# Patient Record
Sex: FEMALE | Race: OTHER | HISPANIC OR LATINO | Employment: FULL TIME | URBAN - METROPOLITAN AREA
[De-identification: names, ages, dates, MRNs, and addresses within clinical notes are randomized per-mention and may not be internally consistent; named-entity substitution may affect disease eponyms.]

---

## 2018-10-03 ENCOUNTER — HOSPITAL ENCOUNTER (EMERGENCY)
Facility: HOSPITAL | Age: 43
Discharge: HOME | End: 2018-10-03
Attending: EMERGENCY MEDICINE
Payer: COMMERCIAL

## 2018-10-03 ENCOUNTER — APPOINTMENT (EMERGENCY)
Dept: RADIOLOGY | Facility: HOSPITAL | Age: 43
End: 2018-10-03
Payer: COMMERCIAL

## 2018-10-03 VITALS
RESPIRATION RATE: 18 BRPM | OXYGEN SATURATION: 98 % | WEIGHT: 177 LBS | DIASTOLIC BLOOD PRESSURE: 63 MMHG | TEMPERATURE: 98.5 F | HEART RATE: 82 BPM | SYSTOLIC BLOOD PRESSURE: 102 MMHG

## 2018-10-03 DIAGNOSIS — R50.9 FEVER: ICD-10-CM

## 2018-10-03 DIAGNOSIS — R19.7 DIARRHEA: ICD-10-CM

## 2018-10-03 DIAGNOSIS — K52.9 COLITIS: Primary | ICD-10-CM

## 2018-10-03 DIAGNOSIS — R10.9 ABDOMINAL PAIN: ICD-10-CM

## 2018-10-03 LAB
ALBUMIN SERPL BCP-MCNC: 3.7 G/DL (ref 3.5–5)
ALP SERPL-CCNC: 58 U/L (ref 46–116)
ALT SERPL W P-5'-P-CCNC: 24 U/L (ref 12–78)
ANION GAP SERPL CALCULATED.3IONS-SCNC: 6 MMOL/L (ref 4–13)
AST SERPL W P-5'-P-CCNC: 20 U/L (ref 5–45)
ATRIAL RATE: 94 BPM
BASOPHILS # BLD AUTO: 0.01 THOUSANDS/ΜL (ref 0–0.1)
BASOPHILS NFR BLD AUTO: 0 % (ref 0–1)
BILIRUB SERPL-MCNC: 1.33 MG/DL (ref 0.2–1)
BUN SERPL-MCNC: 12 MG/DL (ref 5–25)
CALCIUM SERPL-MCNC: 9.1 MG/DL (ref 8.3–10.1)
CHLORIDE SERPL-SCNC: 99 MMOL/L (ref 100–108)
CO2 SERPL-SCNC: 27 MMOL/L (ref 21–32)
CREAT SERPL-MCNC: 0.84 MG/DL (ref 0.6–1.3)
EOSINOPHIL # BLD AUTO: 0 THOUSAND/ΜL (ref 0–0.61)
EOSINOPHIL NFR BLD AUTO: 0 % (ref 0–6)
ERYTHROCYTE [DISTWIDTH] IN BLOOD BY AUTOMATED COUNT: 12.2 % (ref 11.6–15.1)
GFR SERPL CREATININE-BSD FRML MDRD: 85 ML/MIN/1.73SQ M
GLUCOSE SERPL-MCNC: 123 MG/DL (ref 65–140)
HCT VFR BLD AUTO: 43.4 % (ref 34.8–46.1)
HGB BLD-MCNC: 14.5 G/DL (ref 11.5–15.4)
HOLD SPECIMEN: NORMAL
IMM GRANULOCYTES # BLD AUTO: 0.02 THOUSAND/UL (ref 0–0.2)
IMM GRANULOCYTES NFR BLD AUTO: 0 % (ref 0–2)
LIPASE SERPL-CCNC: 68 U/L (ref 73–393)
LYMPHOCYTES # BLD AUTO: 0.41 THOUSANDS/ΜL (ref 0.6–4.47)
LYMPHOCYTES NFR BLD AUTO: 6 % (ref 14–44)
MCH RBC QN AUTO: 30.4 PG (ref 26.8–34.3)
MCHC RBC AUTO-ENTMCNC: 33.4 G/DL (ref 31.4–37.4)
MCV RBC AUTO: 91 FL (ref 82–98)
MONOCYTES # BLD AUTO: 0.39 THOUSAND/ΜL (ref 0.17–1.22)
MONOCYTES NFR BLD AUTO: 6 % (ref 4–12)
NEUTROPHILS # BLD AUTO: 5.66 THOUSANDS/ΜL (ref 1.85–7.62)
NEUTS SEG NFR BLD AUTO: 88 % (ref 43–75)
NRBC BLD AUTO-RTO: 0 /100 WBCS
P AXIS: 62 DEGREES
PLATELET # BLD AUTO: 217 THOUSANDS/UL (ref 149–390)
PMV BLD AUTO: 9.6 FL (ref 8.9–12.7)
POTASSIUM SERPL-SCNC: 3.3 MMOL/L (ref 3.5–5.3)
PR INTERVAL: 150 MS
PROT SERPL-MCNC: 8.2 G/DL (ref 6.4–8.2)
QRS AXIS: 62 DEGREES
QRSD INTERVAL: 80 MS
QT INTERVAL: 334 MS
QTC INTERVAL: 417 MS
RBC # BLD AUTO: 4.77 MILLION/UL (ref 3.81–5.12)
SODIUM SERPL-SCNC: 132 MMOL/L (ref 136–145)
T WAVE AXIS: 47 DEGREES
VENTRICULAR RATE: 94 BPM
WBC # BLD AUTO: 6.49 THOUSAND/UL (ref 4.31–10.16)

## 2018-10-03 PROCEDURE — 74177 CT ABD & PELVIS W/CONTRAST: CPT

## 2018-10-03 PROCEDURE — 93010 ELECTROCARDIOGRAM REPORT: CPT | Performed by: INTERNAL MEDICINE

## 2018-10-03 PROCEDURE — 96361 HYDRATE IV INFUSION ADD-ON: CPT

## 2018-10-03 PROCEDURE — 96374 THER/PROPH/DIAG INJ IV PUSH: CPT

## 2018-10-03 PROCEDURE — 83690 ASSAY OF LIPASE: CPT | Performed by: EMERGENCY MEDICINE

## 2018-10-03 PROCEDURE — 93005 ELECTROCARDIOGRAM TRACING: CPT

## 2018-10-03 PROCEDURE — 80053 COMPREHEN METABOLIC PANEL: CPT | Performed by: EMERGENCY MEDICINE

## 2018-10-03 PROCEDURE — 85025 COMPLETE CBC W/AUTO DIFF WBC: CPT | Performed by: EMERGENCY MEDICINE

## 2018-10-03 PROCEDURE — 99284 EMERGENCY DEPT VISIT MOD MDM: CPT

## 2018-10-03 PROCEDURE — 36415 COLL VENOUS BLD VENIPUNCTURE: CPT

## 2018-10-03 RX ORDER — SUCRALFATE ORAL 1 G/10ML
1000 SUSPENSION ORAL ONCE
Status: COMPLETED | OUTPATIENT
Start: 2018-10-03 | End: 2018-10-03

## 2018-10-03 RX ORDER — ACETAMINOPHEN 325 MG/1
650 TABLET ORAL ONCE
Status: COMPLETED | OUTPATIENT
Start: 2018-10-03 | End: 2018-10-03

## 2018-10-03 RX ORDER — MAGNESIUM HYDROXIDE/ALUMINUM HYDROXICE/SIMETHICONE 120; 1200; 1200 MG/30ML; MG/30ML; MG/30ML
30 SUSPENSION ORAL ONCE
Status: COMPLETED | OUTPATIENT
Start: 2018-10-03 | End: 2018-10-03

## 2018-10-03 RX ORDER — IBUPROFEN 200 MG
200 TABLET ORAL EVERY 6 HOURS
COMMUNITY

## 2018-10-03 RX ORDER — ONDANSETRON 2 MG/ML
4 INJECTION INTRAMUSCULAR; INTRAVENOUS ONCE
Status: COMPLETED | OUTPATIENT
Start: 2018-10-03 | End: 2018-10-03

## 2018-10-03 RX ORDER — POTASSIUM CHLORIDE 20 MEQ/1
40 TABLET, EXTENDED RELEASE ORAL ONCE
Status: COMPLETED | OUTPATIENT
Start: 2018-10-03 | End: 2018-10-03

## 2018-10-03 RX ADMIN — SODIUM CHLORIDE 1000 ML: 0.9 INJECTION, SOLUTION INTRAVENOUS at 16:24

## 2018-10-03 RX ADMIN — IOHEXOL 100 ML: 350 INJECTION, SOLUTION INTRAVENOUS at 17:06

## 2018-10-03 RX ADMIN — SUCRALFATE 1000 MG: 1 SUSPENSION ORAL at 18:23

## 2018-10-03 RX ADMIN — LIDOCAINE HYDROCHLORIDE 15 ML: 20 SOLUTION ORAL; TOPICAL at 18:23

## 2018-10-03 RX ADMIN — ACETAMINOPHEN 650 MG: 325 TABLET ORAL at 14:56

## 2018-10-03 RX ADMIN — POTASSIUM CHLORIDE 40 MEQ: 1500 TABLET, EXTENDED RELEASE ORAL at 18:23

## 2018-10-03 RX ADMIN — ONDANSETRON 4 MG: 2 INJECTION INTRAMUSCULAR; INTRAVENOUS at 16:24

## 2018-10-03 RX ADMIN — ALUMINUM HYDROXIDE, MAGNESIUM HYDROXIDE, AND SIMETHICONE 30 ML: 200; 200; 20 SUSPENSION ORAL at 18:23

## 2018-10-03 NOTE — ED NOTES
Pt c/o acid feeling in her belly   Dr Cristina Pascual made aware     Taryn Maria, RN  10/03/18 9409

## 2018-10-03 NOTE — DISCHARGE INSTRUCTIONS
Colitis   WHAT YOU NEED TO KNOW:   Colitis is swelling and irritation of your colon  Colitis may be caused by ulcers or a problem with your immune system  Bacteria, a virus, or a parasite may also cause colitis  The cause may not be known  You may have diarrhea, abdominal pain, fever, or blood or mucus in your bowel movement  DISCHARGE INSTRUCTIONS:   Return to the emergency department if:   · You have sudden trouble breathing  · Your bowel movements are black or have blood in them  · You have blood in your vomit  · You have severe abdominal pain or your abdomen is swollen and feels hard  · You have any of the following signs of dehydration:     ¨ Dizziness or weakness    ¨ Dry mouth, cracked lips, or severe thirst    ¨ Fast heartbeat or breathing    ¨ Urinating very little or not at all  Contact your healthcare provider if:   · Your symptoms get worse or do not go away  · You have a fever, chills, cough, or feel weak and achy  · You suddenly lose weight without trying  · You have questions or concerns about your condition or care  Medicines:   · Medicines  may be given to decrease inflammation in your colon and treat diarrhea  · Take your medicine as directed  Contact your healthcare provider if you think your medicine is not helping or if you have side effects  Tell him of her if you are allergic to any medicine  Keep a list of the medicines, vitamins, and herbs you take  Include the amounts, and when and why you take them  Bring the list or the pill bottles to follow-up visits  Carry your medicine list with you in case of an emergency  Manage your symptoms:   · Drink liquids as directed  to help prevent dehydration  Good liquids to drink include water, juice, and broth  Ask how much liquid to drink each day  You may need to drink an oral rehydration solution (ORS)  An ORS contains a balance of water, salt, and sugar to replace body fluids lost during diarrhea       · Eat a variety of healthy foods  Healthy foods include fruits, vegetables, whole-grain breads, beans, low-fat dairy products, lean meats, and fish  You may need to eat several small meals throughout the day instead of large meals  Avoid spicy foods, caffeine, chocolate, and foods high in fat  · Talk to your healthcare provider before you take NSAIDs  NSAIDs can cause worsen your symptoms if ulcers are causing your colitis  · Start to exercise when you feel better  Regular exercise helps your bowels work normally  Ask about the best exercise plan for you  Follow up with your healthcare provider as directed: You may need to return for a colonoscopy or other tests  Write down how often you have a bowel movements and what they look like  Bring this to your follow-up visits  Write down your questions so you remember to ask them during your visits  © 2017 [employer] Information is for End User's use only and may not be sold, redistributed or otherwise used for commercial purposes  All illustrations and images included in CareNotes® are the copyrighted property of A [employer]  or [employer]  The above information is an  only  It is not intended as medical advice for individual conditions or treatments  Talk to your doctor, nurse or pharmacist before following any medical regimen to see if it is safe and effective for you  Acute Diarrhea   WHAT YOU NEED TO KNOW:   Acute diarrhea starts quickly and lasts a short time, usually 1 to 3 days  It can last up to 2 weeks  You may not be able to control your diarrhea  Acute diarrhea usually stops on its own  DISCHARGE INSTRUCTIONS:   Return to the emergency department if:   · You feel confused  · Your heartbeat is faster than normal      · Your eyes look deeply sunken, or you have no tears when you cry  · You urinate less than usual, or your urine is dark yellow  · You have blood or mucus in your stools      · You have severe abdominal pain  · You are unable to drink any liquids  Contact your healthcare provider if:   · Your symptoms do not get better with treatment  · You have a fever higher than 101 3°F (38 5°C)  · You have trouble eating and drinking because you are vomiting  · You are thirsty or have a dry mouth  · Your diarrhea does not get better in 7 days  · You have questions or concerns about your condition or care  Follow up with your healthcare provider as directed:  Write down your questions so you remember to ask them during your visits  Medicines:  · Diarrhea medicine  is an over-the-counter medicine that helps slow or stop your diarrhea  If you take other medicines, talk to your healthcare provider before you take diarrhea medicine  · Antibiotics  may be given to help treat an infection caused by bacteria  · Antiparasitics  may be given to treat an infection caused by parasites  · Take your medicine as directed  Contact your healthcare provider if you think your medicine is not helping or if you have side effects  Tell him of her if you are allergic to any medicine  Keep a list of the medicines, vitamins, and herbs you take  Include the amounts, and when and why you take them  Bring the list or the pill bottles to follow-up visits  Carry your medicine list with you in case of an emergency  Self-care:   · Drink liquids as directed  Liquids will help prevent dehydration caused by diarrhea  Ask your healthcare provider how much liquid to drink each day and which liquids are best for you  You may need to drink an oral rehydration solution (ORS)  An ORS has the right amounts of water, salts, and sugar you need to replace body fluids  You can buy an ORS at most grocery stores and pharmacies  · Eat foods that are easy to digest   Examples include rice, lentils, cereal, bananas, potatoes, and bread   It also includes some fruits (bananas, melon), well-cooked vegetables, and lean meats  Avoid foods high in fiber, fat, and sugar  Also avoid caffeine, alcohol, dairy, and red meat until your diarrhea is gone  Prevent acute diarrhea:   · Wash your hands often  Use soap and water  Wash your hands before you eat or prepare food  Also wash your hands after you use the bathroom  Use an alcohol-based hand gel when soap and water are not available  · Keep bathroom surfaces clean  This helps prevent the spread of germs that cause acute diarrhea  · Wash fruits and vegetables well before you eat them  This can help remove germs that cause diarrhea  If possible, remove the skin from fruits and vegetables, or cook them well before you eat them  · Cook meat as directed  ¨ Cook ground meat  to 160°F      ¨ Cook ground poultry, whole poultry, or cuts of poultry  to at least 165°F  Remove the meat from heat  Let it stand for 3 minutes before you eat it  ¨ Cook whole cuts of meat other than poultry  to at least 145°F  Remove the meat from heat  Let it stand for 3 minutes before you eat it  · Wash dishes that have touched raw meat with hot water and soap  This includes cutting boards, utensils, dishes, and serving containers  · Place raw or cooked meat in the refrigerator as soon as possible  Bacteria can grow in meat that is left at room temperature too long  · Do not eat raw or undercooked oysters, clams, or mussels  These foods may be contaminated and cause infection  · Drink filtered or treated water only when you travel  Do not put ice in your drinks  Drink bottled water whenever possible  © 2017 [employer] Information is for End User's use only and may not be sold, redistributed or otherwise used for commercial purposes  All illustrations and images included in CareNotes® are the copyrighted property of A [employer] M [employer]  or [employer]  The above information is an  only   It is not intended as medical advice for individual conditions or treatments  Talk to your doctor, nurse or pharmacist before following any medical regimen to see if it is safe and effective for you

## 2018-10-03 NOTE — ED PROVIDER NOTES
History  Chief Complaint   Patient presents with    Abdominal Pain     patient complains of RUQ tenderness with nausea, fevers and decrease appetite  [facility]  HPI    44yo female presents for evaluation of abdominal pain  Patient says she began having lower abdominal pain 3 days ago without improvement  Patient points to her lower abdomen and RUQ  She describes discomfort as a "cramping pain " Pain is a 5 out of 10 currently but at time increases in severity to a 10 with mild radiation to lower back  Pain has been associated with fever, chills, nausea and watery diarrhea with >5 episodes a day  Patient notes she has recently been worked up by GI with an EGD due to epigastric pain and was found to have H Pylori 5 months ago but that pain this time around is different  Notes having a poor appetite and that anything she takes in affects her stomach and results in diarrhea  Denies sick contact, recent travel, or new exposures  Denies chest pain, SOB, vomiting, melena, hematochezia, dysuria or rash  Hx of hysterectomy  Prior to Admission Medications   Prescriptions Last Dose Informant Patient Reported? Taking?   ibuprofen (MOTRIN) 200 mg tablet   Yes No   Sig: Take 200 mg by mouth every 6 (six) hours      Facility-Administered Medications: None       History reviewed  No pertinent past medical history  Past Surgical History:   Procedure Laterality Date    HYSTERECTOMY      OOPHORECTOMY         History reviewed  No pertinent family history  I have reviewed and agree with the history as documented  Social History   Substance Use Topics    Smoking status: Never Smoker    Smokeless tobacco: Never Used    Alcohol use Yes      Comment: social        Review of Systems   Constitutional: Positive for appetite change, chills and fever  Negative for diaphoresis and fatigue  HENT: Negative for congestion, rhinorrhea and sore throat      Eyes: Negative for photophobia and visual disturbance  Respiratory: Negative for cough, chest tightness and shortness of breath  Cardiovascular: Negative for chest pain and palpitations  Gastrointestinal: Positive for abdominal pain, diarrhea and nausea  Negative for abdominal distention, anal bleeding, blood in stool, constipation, rectal pain and vomiting  Genitourinary: Negative for dysuria, frequency and hematuria  Musculoskeletal: Negative for back pain, gait problem, myalgias, neck pain and neck stiffness  Skin: Negative for pallor and rash  Allergic/Immunologic: Negative for food allergies  Neurological: Negative for syncope, weakness, light-headedness, numbness and headaches  Hematological: Negative for adenopathy  Does not bruise/bleed easily  All other systems reviewed and are negative  Physical Exam  ED Triage Vitals   Temperature Pulse Respirations Blood Pressure SpO2   10/03/18 1451 10/03/18 1451 10/03/18 1451 10/03/18 1451 10/03/18 1451   (!) 101 8 °F (38 8 °C) (!) 114 18 111/63 98 %      Temp Source Heart Rate Source Patient Position - Orthostatic VS BP Location FiO2 (%)   10/03/18 1451 10/03/18 1626 10/03/18 1451 10/03/18 1451 --   Oral Monitor Sitting Left arm       Pain Score       10/03/18 1451       7           Orthostatic Vital Signs  Vitals:    10/03/18 1451 10/03/18 1626   BP: 111/63 102/63   Pulse: (!) 114 82   Patient Position - Orthostatic VS: Sitting Sitting       Physical Exam   Constitutional: She is oriented to person, place, and time  No distress  Patient is alert and oriented, appears uncomfortable but nontoxic   HENT:   Head: Normocephalic and atraumatic  Mouth/Throat: Oropharynx is clear and moist  No oropharyngeal exudate  Eyes: Pupils are equal, round, and reactive to light  Conjunctivae and EOM are normal    Neck: Normal range of motion  Neck supple  Cardiovascular: Normal rate, regular rhythm, normal heart sounds and intact distal pulses      Pulmonary/Chest: Effort normal and breath sounds normal  No respiratory distress  She has no wheezes  She has no rales  Abdominal: Soft  Bowel sounds are normal  She exhibits no distension and no mass  There is tenderness  There is no guarding  Tenderness to palpation of RUQ, RLQ and LLQ (worst)   Musculoskeletal: Normal range of motion  Lymphadenopathy:     She has no cervical adenopathy  Neurological: She is alert and oriented to person, place, and time  No facial asymmetry noted, CN 2-12 intact, full ROM of upper and lower extremities, muscle strength 5/5 throughout, DTRs normal, sensation intact throughout, negative finger to nose/Pete, negative Romberg's, negative Babinski, no gait disturbance noted   Skin: Skin is warm and dry  Capillary refill takes less than 2 seconds  No rash noted  She is not diaphoretic  No erythema  There is pallor  Psychiatric: She has a normal mood and affect  Her behavior is normal  Judgment and thought content normal    Nursing note and vitals reviewed        ED Medications  Medications   acetaminophen (TYLENOL) tablet 650 mg (650 mg Oral Given 10/3/18 1456)   sodium chloride 0 9 % bolus 1,000 mL (0 mL Intravenous Stopped 10/3/18 1809)   ondansetron (ZOFRAN) injection 4 mg (4 mg Intravenous Given 10/3/18 1624)   iohexol (OMNIPAQUE) 350 MG/ML injection (MULTI-DOSE) 100 mL (100 mL Intravenous Given 10/3/18 1706)   potassium chloride (K-DUR,KLOR-CON) CR tablet 40 mEq (40 mEq Oral Given 10/3/18 1823)   lidocaine viscous (XYLOCAINE) 2 % mucosal solution 15 mL (15 mL Swish & Swallow Given 10/3/18 1823)   aluminum-magnesium hydroxide-simethicone (MYLANTA) 200-200-20 mg/5 mL oral suspension 30 mL (30 mL Oral Given 10/3/18 1823)   sucralfate (CARAFATE) oral suspension 1,000 mg (1,000 mg Oral Given 10/3/18 1823)       Diagnostic Studies  Results Reviewed     Procedure Component Value Units Date/Time    Comprehensive metabolic panel [[id number]]  (Abnormal) Collected:  10/03/18 1502    Lab Status:  Final result Specimen:  Blood from Arm, Right Updated:  10/03/18 1542     Sodium 132 (L) mmol/L      Potassium 3 3 (L) mmol/L      Chloride 99 (L) mmol/L      CO2 27 mmol/L      ANION GAP 6 mmol/L      BUN 12 mg/dL      Creatinine 0 84 mg/dL      Glucose 123 mg/dL      Calcium 9 1 mg/dL      AST 20 U/L      ALT 24 U/L      Alkaline Phosphatase 58 U/L      Total Protein 8 2 g/dL      Albumin 3 7 g/dL      Total Bilirubin 1 33 (H) mg/dL      eGFR 85 ml/min/1 73sq m     Narrative:         National Kidney Disease Education Program recommendations are as follows:  GFR calculation is accurate only with a steady state creatinine  Chronic Kidney disease less than 60 ml/min/1 73 sq  meters  Kidney failure less than 15 ml/min/1 73 sq  meters      Lipase [[id number]]  (Abnormal) Collected:  10/03/18 1502    Lab Status:  Final result Specimen:  Blood from Arm, Right Updated:  10/03/18 1542     Lipase 68 (L) u/L     CBC and differential [[id number]]  (Abnormal) Collected:  10/03/18 1502    Lab Status:  Final result Specimen:  Blood from Arm, Right Updated:  10/03/18 1518     WBC 6 49 Thousand/uL      RBC 4 77 Million/uL      Hemoglobin 14 5 g/dL      Hematocrit 43 4 %      MCV 91 fL      MCH 30 4 pg      MCHC 33 4 g/dL      RDW 12 2 %      MPV 9 6 fL      Platelets 162 Thousands/uL      nRBC 0 /100 WBCs      Neutrophils Relative 88 (H) %      Immat GRANS % 0 %      Lymphocytes Relative 6 (L) %      Monocytes Relative 6 %      Eosinophils Relative 0 %      Basophils Relative 0 %      Neutrophils Absolute 5 66 Thousands/µL      Immature Grans Absolute 0 02 Thousand/uL      Lymphocytes Absolute 0 41 (L) Thousands/µL      Monocytes Absolute 0 39 Thousand/µL      Eosinophils Absolute 0 00 Thousand/µL      Basophils Absolute 0 01 Thousands/µL                  CT abdomen pelvis with contrast   Final Result by Maria Luz Clark MD (10/03 1716)      Diffuse colonic thickening most prominent through the ascending in keeping with a nonspecific colitis, likely infectious or inflammatory  No bowel obstruction or bowel pneumatosis  The study was marked in [facility] for immediate notification  Workstation performed: [id number]               Procedures  Procedures      Phone Consults  ED Phone Contact    ED Course                               MDM  Number of Diagnoses or Management Options  Abdominal pain:   Colitis:   Diarrhea:   Fever:   Diagnosis management comments: Impression: 46yo female presents for evaluation of fever and abdominal pain  Ddx: gastroenteritis, cholecystitis, appendicitis, diverticulitis   Plan: cmp, cbc, lipase, IVF, zofran, bedside u/s, CTAP     The patient was instructed to follow up as documented  Strict return precautions were discussed with the patient and the patient was instructed to return to the emergency department immediately if symptoms worsen  The patient/patient family member acknowledged and were in agreement with plan  CritCare Time    Disposition  Final diagnoses:   Abdominal pain   Diarrhea   Fever   Colitis     Time reflects when diagnosis was documented in both MDM as applicable and the Disposition within this note     Time User Action Codes Description Comment    10/3/2018  6:04 PM Jorge Venango [R10 9] Abdominal pain     10/3/2018  6:04 PM Rina Yenny Add [R19 7] Diarrhea     10/3/2018  6:04 PM Jorge Venango [R50 9] Fever     10/3/2018  6:06 PM Jorge Venango [K52 9] Colitis     10/3/2018  6:08 PM Jed So [R10 9] Abdominal pain     10/3/2018  6:08 PM Rina Yenny Modify [K52 9] Colitis       ED Disposition     ED Disposition Condition Comment    Discharge  Andrei Majors discharge to home/self care      Condition at discharge: Good        Follow-up Information     Follow up With Specialties Details Why Contact Info Additional Information    Evelyn Batista MD  Go in 3 days As needed, If symptoms worsen Loki Malhotra [address] [place] [address] [id number] [phone] [address] [place] [address] Emergency Department Emergency Medicine Go to As needed, If symptoms worsen [address]  [phone]  ED, [address] [facility], [place], [place], [address]          Discharge Medication List as of 10/3/2018  6:09 PM      CONTINUE these medications which have NOT CHANGED    Details   ibuprofen (MOTRIN) 200 mg tablet Take 200 mg by mouth every 6 (six) hours, Historical Med           No discharge procedures on file  ED Provider  Attending physically available and evaluated Saqib Carrion I managed the patient along with the ED Attending      Electronically Signed by         Cinda Mansfield MD  10/04/18 8351

## 2018-10-03 NOTE — ED ATTENDING ATTESTATION
Ashlee Geronimo MD, saw and evaluated the patient  I have discussed the patient with the resident/non-physician practitioner and agree with the resident's/non-physician practitioner's findings, Plan of Care, and MDM as documented in the resident's/non-physician practitioner's note, except where noted  All available labs and Radiology studies were reviewed  At this point I agree with the current assessment done in the Emergency Department  I have conducted an independent evaluation of this patient a history and physical is as follows:     abdominal pain, vomiting, diarrhea, fever  Symptoms present for a couple of days  Difficulty keeping anything down  Abdominal pain is in the right abdomen  Aching and cramping  No blood in the diarrhea or the vomit  Patient is otherwise healthy  Review of systems otherwise negative in 12 systems reviewed  On exam the patient is tachycardic, febrile, appears uncomfortable  She has tenderness in her bilateral lower quadrants with no rebound or guarding  The remainder of her exam is normal   Impression:  Gastroenteritis versus surgical pathology    Will CT  Critical Care Time  CritCare Time    Procedures